# Patient Record
(demographics unavailable — no encounter records)

---

## 2017-02-04 NOTE — NUR
3 Y/O F BIB PARENTS C/O VOMITTING X 1 DAY. MOTHER DENIES ANY FEVER OR DIARRHEA. 
PT RESTING IN BED, ASLEEP, CONNECTED TO MONITOR, NO S/S OF DISTRESS NOTED AT 
THIS TIME.

## 2017-02-04 NOTE — NUR
Patient discharged with v/s stable. Written and verbal after care instructions 
given and explained. 

Patient alert, oriented and verbalized understanding of instructions. Carried 
with by parent. All questions addressed prior to discharge. ID band removed. 
Patient advised to follow up with PMD. Rx of ZOFRAN 4 MG given. Patient 
educated on indication of medication including possible reaction and side 
effects. Opportunity to ask questions provided and answered.

## 2017-02-06 NOTE — NUR
PT SLEEPING ON BED, PARENTS AT BEDSIDE. LUNGS CLEAR, NO S/S OF RESP DISTRESS 
NOTED AT THIS TIME. IV FLUIDS RUNNING, WILL CONT TO MONITOR PT.

## 2017-02-06 NOTE — NUR
2Y/O F BIB MOTHER W/C/O GENERALIZED WEAKNESS. PER MOTHER PT DOES NOT WANT TO 
EAT OR DRINK ANYTHING SINCE FRIDAY. PATIENT WAS HERE ON FRIDAY FOR N/V DX 
STOMACH FLU; PRESCRIBED ZOFRAN. DENIES ANY FEVER. NO S/S OF DISTRESS NOTED AT 
THIS TIME. PT ON MONITOR.

## 2017-02-07 NOTE — NUR
PT TAKEN BY DEMARIO. PT ACCOMPANIED BY MOTHER IN AMBULANCE. PT STABLE, VSS, 
SLEEPING, NO S/S OF DISTRESS NOTED AT D/C.

## 2017-02-07 NOTE — NUR
Patient to be transferred to [facility].  Is being transferred due to APPENDECITIS.  
Receiving facility has accepting physician and available space. ER physician 
has signed transfer form.  Patient or responsible party has agreed to transfer 
and signed form.  Patient belongings inventoried and will be sent with patient. 
 Copy of nursing notes, lab reports, EKG, Physicians Orders and X-rays to be 
sent with patient.  Report called to LESLIE Mckeon at receiving facility.  [facility] 
ambulance service has been called for transfer.  ETA is 60 MINUTES.